# Patient Record
Sex: FEMALE | Race: WHITE | URBAN - METROPOLITAN AREA
[De-identification: names, ages, dates, MRNs, and addresses within clinical notes are randomized per-mention and may not be internally consistent; named-entity substitution may affect disease eponyms.]

---

## 2017-03-20 ENCOUNTER — OFFICE VISIT (OUTPATIENT)
Dept: OBGYN CLINIC | Age: 27
End: 2017-03-20

## 2017-03-20 VITALS
WEIGHT: 153 LBS | HEART RATE: 83 BPM | BODY MASS INDEX: 23.19 KG/M2 | SYSTOLIC BLOOD PRESSURE: 114 MMHG | HEIGHT: 68 IN | DIASTOLIC BLOOD PRESSURE: 74 MMHG

## 2017-03-20 DIAGNOSIS — Z11.3 SCREENING FOR VENEREAL DISEASE: ICD-10-CM

## 2017-03-20 DIAGNOSIS — Z01.419 ENCOUNTER FOR WELL WOMAN EXAM WITH ROUTINE GYNECOLOGICAL EXAM: Primary | ICD-10-CM

## 2017-03-20 NOTE — PATIENT INSTRUCTIONS

## 2017-03-20 NOTE — PROGRESS NOTES
Annual exam ages 21-44    Yohannes Johnson is a ,  32 y.o. female [facility] Patient's last menstrual period was 2017 (approximate). , who presents for her annual checkup. Since her last annual GYN exam about one year ago on 3/15/16, she has had the following changes in her health history: None    On [employer]. H/o ovarian cysts. Good suppression on OCPs, wishes to continue. ADDITIONAL CONCERNS:  She is having no significant problems. With regard to the Gardasil vaccine, she has received all 3 injections. Menstrual status:    Her periods are spotting, light in flow. She is using one to two pads or tampons per day, usually regular and last 26-30 days. She denies dysmenorrhea. She denies premenstrual symptoms. Contraception:    The current method of family planning is OCP (estrogen/progesterone). Sexual history:     She  reports that she currently engages in sexual activity and has had male partners. She reports using the following method of birth control/protection: Pill. .        Pap and Mammogram History:    Her most recent Pap smear was normal, HPV was not indicated, obtained 2 year(s) ago on 2/11/15. She does not have a history of abnormal paps. The patient has never had a mammogram.    Breast Cancer History/Substance Abuse: Positive - MGM, PGM, Maternal Aunt and Maternal Great Aunt. Past Medical History:   Diagnosis Date    Acne     Uses Azcone    Dysmenorrhea     Headache(784.0)     HPV vaccine counseling     Gardasil series completed     Nail fungus 2014    Currently taking Lamisil    Screening for malignant neoplasm of the cervix 2015    Negative (no hpv)      History reviewed. No pertinent surgical history.   OB History    Para Term  AB SAB TAB Ectopic Multiple Living   0 0 0 0 0 0 0 0 0 0             Current Outpatient Prescriptions   Medication Sig Dispense Refill    Norethindrn A-E Estradiol-Iron (LOMEDIA 24 FE) 1 mg-20 mcg (24)/75 mg (4) tab Take 1 Tab by mouth daily. 84 Tab 4    terbinafine (LAMISIL) 250 mg tablet Take 250 mg by mouth daily. Allergies: Review of patient's allergies indicates no known allergies. Social History     Social History    Marital status: SINGLE     Spouse name: N/A    Number of children: N/A    Years of education: N/A     Occupational History    Not on file. Social History Main Topics    Smoking status: Never Smoker    Smokeless tobacco: Never Used      Comment: Never used vapor or e-cigs     Alcohol use No    Drug use: No    Sexual activity: Yes     Partners: Male     Birth control/ protection: Pill     Other Topics Concern    Not on file     Social History Narrative     Tobacco History:  reports that she has never smoked. She has never used smokeless tobacco.  Alcohol Abuse:  reports that she does not drink alcohol. Drug Abuse:  reports that she does not use illicit drugs. There is no problem list on file for this patient.     Family History   Problem Relation Age of Onset    Hypertension Father     Elevated Lipids Father     Breast Cancer Maternal Aunt      Great Aunt    Breast Cancer Maternal Grandmother     Breast Cancer Paternal Grandmother     Heart Disease Paternal Grandmother        Review of Systems - History obtained from the patient  Constitutional: negative for weight loss, fever, night sweats  HEENT: negative for hearing loss, earache, congestion, snoring, sorethroat  CV: negative for chest pain, palpitations, edema  Resp: negative for cough, shortness of breath, wheezing  GI: negative for change in bowel habits, abdominal pain, black or bloody stools  : negative for frequency, dysuria, hematuria, vaginal discharge  MSK: negative for back pain, joint pain, muscle pain  Breast: negative for breast lumps, nipple discharge, galactorrhea  Skin :negative for itching, rash, hives  Neuro: negative for dizziness, headache, confusion, weakness  Psych: negative for anxiety, depression, change in mood  Heme/lymph: negative for bleeding, bruising, pallor    Physical Exam    Visit Vitals    /74    Pulse 83    Ht 5' 8\" (1.727 m)    Wt 153 lb (69.4 kg)    LMP 02/27/2017 (Approximate)    BMI 23.26 kg/m2       Constitutional  · Appearance: well-nourished, well developed, alert, in no acute distress    HENT  · Head and Face: appears normal    Neck  · Inspection/Palpation: normal appearance, no masses or tenderness  · Lymph Nodes: no lymphadenopathy present  · Thyroid: gland size normal, nontender, no nodules or masses present on palpation    Chest  · Respiratory Effort: breathing unlabored  · Auscultation: normal breath sounds    Cardiovascular  · Heart:  · Auscultation: regular rate and rhythm without murmur    Breasts  · Inspection of Breasts: breasts symmetrical, no skin changes, no discharge present, nipple appearance normal, no skin retraction present  · Palpation of Breasts and Axillae: no masses present on palpation, no breast tenderness  · Axillary Lymph Nodes: no lymphadenopathy present    Gastrointestinal  · Abdominal Examination: abdomen non-tender to palpation, normal bowel sounds, no masses present  · Liver and spleen: no hepatomegaly present, spleen not palpable  · Hernias: no hernias identified    Genitourinary  · External Genitalia: normal appearance for age, no discharge present, no tenderness present, no inflammatory lesions present, no masses present, no atrophy present  · Vagina: normal vaginal vault without central or paravaginal defects, no discharge present, no inflammatory lesions present, no masses present  · Bladder: non-tender to palpation  · Urethra: appears normal  · Cervix: normal   · Uterus: normal size, shape and consistency  · Adnexa: no adnexal tenderness present, no adnexal masses present  · Perineum: perineum within normal limits, no evidence of trauma, no rashes or skin lesions present  · Anus: anus within normal limits, no hemorrhoids present  · Inguinal Lymph Nodes: no lymphadenopathy present    Skin  · General Inspection: no rash, no lesions identified    Neurologic/Psychiatric  · Mental Status:  · Orientation: grossly oriented to person, place and time  · Mood and Affect: mood normal, affect appropriate        Assessment & Plan:  · Routine gynecologic examination. Pap due next year. · NuSwab screening (pt request)  · Deborah Sosa #3 RFx4. · Her current medical status is satisfactory with no evidence of significant gynecologic issues. · Counseled re: diet, exercise, healthy lifestyle  · Return for yearly wellness visits  · Gardisil completed  · Patient verbalized understanding    Orders Placed This Encounter    CT/NG/T.VAGINALIS AMPLIFICATION     Order Specific Question:   Specimen type     Answer:   Vaginal [516]    norethindrone-e estradiol-iron (LOMEDIA 24 FE) 1 mg-20 mcg (24)/75 mg (4) tab     Sig: Take 1 Tab by mouth daily.      Dispense:  84 Tab     Refill:  4

## 2017-03-22 LAB
C TRACH RRNA SPEC QL NAA+PROBE: NEGATIVE
N GONORRHOEA RRNA SPEC QL NAA+PROBE: NEGATIVE
T VAGINALIS RRNA SPEC QL NAA+PROBE: NEGATIVE

## 2017-08-09 ENCOUNTER — TELEPHONE (OUTPATIENT)
Dept: OBGYN CLINIC | Age: 27
End: 2017-08-09

## 2017-08-09 NOTE — TELEPHONE ENCOUNTER
I would recommend doubling up yesterday and today. OK to continue pack, but should use backup method for the rest of the pack. May have BTB until the next pack.

## 2017-08-09 NOTE — TELEPHONE ENCOUNTER
Patient calling stating that she missed Sunday's and Monday's pills and on yesterday, she took Sunday's pill as directed by the pharmacist was to continue just taking 1 pill a day until this upcoming Sunday and stop the pack and restart a new one. Please advise as patient is confused and want to make sure she is covered for pregnancy.

## 2018-04-19 ENCOUNTER — OFFICE VISIT (OUTPATIENT)
Dept: OBGYN CLINIC | Age: 28
End: 2018-04-19

## 2018-04-19 VITALS
HEART RATE: 84 BPM | WEIGHT: 144 LBS | HEIGHT: 68 IN | BODY MASS INDEX: 21.82 KG/M2 | DIASTOLIC BLOOD PRESSURE: 71 MMHG | SYSTOLIC BLOOD PRESSURE: 99 MMHG

## 2018-04-19 DIAGNOSIS — Z11.3 SCREENING FOR VENEREAL DISEASE: ICD-10-CM

## 2018-04-19 DIAGNOSIS — Z87.42 HISTORY OF DYSMENORRHEA: ICD-10-CM

## 2018-04-19 DIAGNOSIS — Z87.42 HISTORY OF OVARIAN CYST: ICD-10-CM

## 2018-04-19 DIAGNOSIS — Z01.419 ENCOUNTER FOR WELL WOMAN EXAM WITH ROUTINE GYNECOLOGICAL EXAM: Primary | ICD-10-CM

## 2018-04-19 NOTE — PATIENT INSTRUCTIONS

## 2018-04-19 NOTE — PROGRESS NOTES
Annual exam ages 21-44    Chidi Rhodes is a [de-identified] P5,  32 y.o. female [facility] Patient's last menstrual period was 2018 (approximate). , who presents for her annual checkup. Since her last annual GYN exam about one year ago on 3/20/17, she has had the following changes in her health history: none    ADDITIONAL CONCERNS:  She is having no significant problems. Desires vaginal STD testing only. H/o dysmenorrhea, ovarian cysts. Doing well with OCPs, wishes to continue. With regard to the Gardasil vaccine, she has received all 3 injections. Menstrual status:    Her periods are light in flow. She is using one to two pads or tampons per day, usually regular and last 26-30 days. She denies dysmenorrhea. She denies premenstrual symptoms. Contraception:    The current method of family planning is OCP (estrogen/progesterone). Sexual history:     She  reports that she currently engages in sexual activity and has had male partners. She reports using the following method of birth control/protection: Pill. .        Pap and Mammogram History:    Her most recent Pap smear was normal, HPV was not indicated, obtained on 2/11/15. She does not have a history of abnormal paps. The patient has never had a mammogram.    Breast Cancer History/Substance Abuse: + Br Ca= MGM, PGM, Maternal aunt and great aunt. Will look into benefits for BRCA testing. Thinks her mom had genetic screening, not sure of results. Past Medical History:   Diagnosis Date    Acne     Uses Azcone    Dysmenorrhea     Headache(784.0)     HPV vaccine counseling     Gardasil series completed     Nail fungus 2014    Currently taking Lamisil    Screening for malignant neoplasm of the cervix 2015    Negative (no hpv)      History reviewed. No pertinent surgical history.   OB History    Para Term  AB Living   0 0 0 0 0 0   SAB TAB Ectopic Molar Multiple Live Births   0 0 0  0 Current Outpatient Prescriptions   Medication Sig Dispense Refill    norethindrone-e estradiol-iron (LOMEDIA 24 FE) 1 mg-20 mcg (24)/75 mg (4) tab Take 1 Tab by mouth daily. 84 Tab 4    terbinafine (LAMISIL) 250 mg tablet Take 250 mg by mouth daily. Allergies: Review of patient's allergies indicates no known allergies. Social History     Social History    Marital status:      Spouse name: N/A    Number of children: N/A    Years of education: N/A     Occupational History    Not on file. Social History Main Topics    Smoking status: Never Smoker    Smokeless tobacco: Never Used      Comment: Never used vapor or e-cigs     Alcohol use No    Drug use: No    Sexual activity: Yes     Partners: Male     Birth control/ protection: Pill     Other Topics Concern    Not on file     Social History Narrative     Tobacco History:  reports that she has never smoked. She has never used smokeless tobacco.  Alcohol Abuse:  reports that she does not drink alcohol. Drug Abuse:  reports that she does not use illicit drugs. There is no problem list on file for this patient.     Family History   Problem Relation Age of Onset    Hypertension Father     Elevated Lipids Father     Breast Cancer Maternal Aunt      Great Aunt    Breast Cancer Maternal Grandmother     Breast Cancer Paternal Grandmother     Heart Disease Paternal Grandmother        Review of Systems - History obtained from the patient  Constitutional: negative for weight loss, fever, night sweats  HEENT: negative for hearing loss, earache, congestion, snoring, sorethroat  CV: negative for chest pain, palpitations, edema  Resp: negative for cough, shortness of breath, wheezing  GI: negative for change in bowel habits, abdominal pain, black or bloody stools  : negative for frequency, dysuria, hematuria, vaginal discharge  MSK: negative for back pain, joint pain, muscle pain  Breast: negative for breast lumps, nipple discharge, galactorrhea  Skin :negative for itching, rash, hives  Neuro: negative for dizziness, headache, confusion, weakness  Psych: negative for anxiety, depression, change in mood  Heme/lymph: negative for bleeding, bruising, pallor    Physical Exam    Visit Vitals    BP 99/71    Pulse 84    Ht 5' 8\" (1.727 m)    Wt 144 lb (65.3 kg)    LMP 03/28/2018 (Approximate)    BMI 21.9 kg/m2       Constitutional  · Appearance: well-nourished, well developed, alert, in no acute distress    HENT  · Head and Face: appears normal    Neck  · Inspection/Palpation: normal appearance, no masses or tenderness  · Lymph Nodes: no lymphadenopathy present  · Thyroid: gland size normal, nontender, no nodules or masses present on palpation    Chest  · Respiratory Effort: breathing unlabored  · Auscultation: normal breath sounds    Cardiovascular  · Heart:  · Auscultation: regular rate and rhythm without murmur    Breasts  · Inspection of Breasts: breasts symmetrical, no skin changes, no discharge present, nipple appearance normal, no skin retraction present  · Palpation of Breasts and Axillae: no masses present on palpation, no breast tenderness  · Axillary Lymph Nodes: no lymphadenopathy present    Gastrointestinal  · Abdominal Examination: abdomen non-tender to palpation, normal bowel sounds, no masses present  · Liver and spleen: no hepatomegaly present, spleen not palpable  · Hernias: no hernias identified    Genitourinary  · External Genitalia: normal appearance for age, no discharge present, no tenderness present, no inflammatory lesions present, no masses present, no atrophy present  · Vagina: normal vaginal vault without central or paravaginal defects, no discharge present, no inflammatory lesions present, no masses present  · Bladder: non-tender to palpation  · Urethra: appears normal  · Cervix: normal   · Uterus: normal size, shape and consistency  · Adnexa: no adnexal tenderness present, no adnexal masses present  · Perineum: perineum within normal limits, no evidence of trauma, no rashes or skin lesions present  · Anus: anus within normal limits, no hemorrhoids present  · Inguinal Lymph Nodes: no lymphadenopathy present    Skin  · General Inspection: no rash, no lesions identified    Neurologic/Psychiatric  · Mental Status:  · Orientation: grossly oriented to person, place and time  · Mood and Affect: mood normal, affect appropriate        Assessment & Plan:  · Routine gynecologic examination. Pap done. · Nuswab GCT at pt request  · Her current medical status is satisfactory with no evidence of significant gynecologic issues. · H/o dysmenorrhea, ov cyst.  Doing well on OCPs, wishes to continue. [facility] #3 RFx3  · FHx breast cancer (MGM, mat great aunt, PGM). Thinks her mother had genetic screening, not sure. Enc to confirm. If neg, then should not need any other testing. · Counseled re: diet, exercise, healthy lifestyle  · Return for yearly wellness visits  · Patient verbalized understanding      Orders Placed This Encounter    CT/NG/T.VAGINALIS AMPLIFICATION     Order Specific Question:   Specimen type     Answer:   Vaginal [516]    norethindrone-e estradiol-iron (LOMEDIA 24 FE) 1 mg-20 mcg (24)/75 mg (4) tab     Sig: Take 1 Tab by mouth daily.      Dispense:  3 Package     Refill:  4    PAP, IG, RFX HPV ASCUS ([id number])

## 2018-04-20 LAB
CYTOLOGIST CVX/VAG CYTO: NORMAL
CYTOLOGY CVX/VAG DOC THIN PREP: NORMAL
DX ICD CODE: NORMAL
LABCORP, 190119: NORMAL
Lab: NORMAL
OTHER STN SPEC: NORMAL
PATH REPORT.FINAL DX SPEC: NORMAL
STAT OF ADQ CVX/VAG CYTO-IMP: NORMAL

## 2018-11-30 ENCOUNTER — TELEPHONE (OUTPATIENT)
Dept: OBGYN CLINIC | Age: 28
End: 2018-11-30

## 2018-11-30 NOTE — TELEPHONE ENCOUNTER
Patient calling stating that she is currently on OCPs but wants to stop them so she can conceive. She was given information to finish out her current pill pack and no restart another pack. Patient given information on timed intercourse, start taking prenatal vitamins now and to track her periods. Patient verbalized understanding.

## 2019-03-12 ENCOUNTER — TELEPHONE (OUTPATIENT)
Dept: OBGYN CLINIC | Age: 29
End: 2019-03-12

## 2019-03-12 NOTE — TELEPHONE ENCOUNTER
Call received at 813am    29year old patient last seen in the office on 4/19/18. Patient calling to say that she had a slightly positive upt last evening and again this am.    Patient reports her LMP to be 2/15/19 ( 3w4d pregnant )    Patient has scheduled confirmation of pregnancy on  3/20/19,    Patient advised at that visit that she would not have an ultrasound since it would be to early. Patient advised that ultrasound is usually done between 6-8 weeks. Patient denies vaginal bleeding and cramping at this time.     Patient will call back if she wants to change her appointment

## 2019-03-12 NOTE — TELEPHONE ENCOUNTER
Patient's , Bibiana Ramírez is calling to change an appointment date for wife. He is listed on [employer] but does not know identifier/password. I advised that he is marked as all access to do such but he needs to know the password in order for anything to be changed or to have his wife call.